# Patient Record
Sex: MALE | Race: WHITE | Employment: OTHER | URBAN - METROPOLITAN AREA
[De-identification: names, ages, dates, MRNs, and addresses within clinical notes are randomized per-mention and may not be internally consistent; named-entity substitution may affect disease eponyms.]

---

## 2021-01-06 ENCOUNTER — OFFICE VISIT (OUTPATIENT)
Dept: FAMILY MEDICINE CLINIC | Facility: CLINIC | Age: 46
End: 2021-01-06
Payer: COMMERCIAL

## 2021-01-06 VITALS
HEART RATE: 83 BPM | SYSTOLIC BLOOD PRESSURE: 110 MMHG | BODY MASS INDEX: 26.53 KG/M2 | WEIGHT: 169 LBS | OXYGEN SATURATION: 99 % | HEIGHT: 67 IN | DIASTOLIC BLOOD PRESSURE: 80 MMHG | TEMPERATURE: 98 F

## 2021-01-06 DIAGNOSIS — M25.561 CHRONIC PAIN OF RIGHT KNEE: Primary | ICD-10-CM

## 2021-01-06 DIAGNOSIS — G89.29 CHRONIC PAIN OF RIGHT KNEE: Primary | ICD-10-CM

## 2021-01-06 DIAGNOSIS — Z98.890 HISTORY OF REPAIR OF ACL: ICD-10-CM

## 2021-01-06 DIAGNOSIS — Z76.89 ENCOUNTER TO ESTABLISH CARE WITH NEW DOCTOR: ICD-10-CM

## 2021-01-06 PROBLEM — M25.562 LEFT KNEE PAIN: Status: ACTIVE | Noted: 2021-01-06

## 2021-01-06 PROCEDURE — 3079F DIAST BP 80-89 MM HG: CPT | Performed by: FAMILY MEDICINE

## 2021-01-06 PROCEDURE — 99214 OFFICE O/P EST MOD 30 MIN: CPT | Performed by: FAMILY MEDICINE

## 2021-01-06 PROCEDURE — 3008F BODY MASS INDEX DOCD: CPT | Performed by: FAMILY MEDICINE

## 2021-01-06 PROCEDURE — 3074F SYST BP LT 130 MM HG: CPT | Performed by: FAMILY MEDICINE

## 2021-01-06 RX ORDER — DICLOFENAC SODIUM 75 MG/1
75 TABLET, DELAYED RELEASE ORAL 2 TIMES DAILY
Qty: 14 TABLET | Refills: 0 | Status: SHIPPED | OUTPATIENT
Start: 2021-01-06 | End: 2021-01-13

## 2021-01-06 NOTE — PATIENT INSTRUCTIONS
Chronic knee pain  Rx Diclofenac 75 mg twice daily X 7 days - take with food   Icing the knee at least 5 minutes each time and perform this at least 3 times per day  Referral given to see Dr Yanick Zimmerman  Will defer PT for now     RHM  Due for annual physica

## 2021-01-06 NOTE — PROGRESS NOTES
[facility] Family Medicine Office Note  Chief Complaint:   Patient presents with:  Establish Care: L knee pain      HPI:   This is a 41year old male coming in to establish care with a new physician and also wants to address his L knee issues.  Hx 7\" (1.702 m)   Wt 169 lb (76.7 kg)   SpO2 99%   BMI 26.47 kg/m²  Estimated body mass index is 26.47 kg/m² as calculated from the following:    Height as of this encounter: 5' 7\" (1.702 m). Weight as of this encounter: 169 lb (76.7 kg).    Vital signs r

## 2021-01-13 ENCOUNTER — OFFICE VISIT (OUTPATIENT)
Dept: FAMILY MEDICINE CLINIC | Facility: CLINIC | Age: 46
End: 2021-01-13
Payer: COMMERCIAL

## 2021-01-13 VITALS
HEIGHT: 67 IN | BODY MASS INDEX: 27.15 KG/M2 | HEART RATE: 76 BPM | TEMPERATURE: 98 F | WEIGHT: 173 LBS | SYSTOLIC BLOOD PRESSURE: 110 MMHG | DIASTOLIC BLOOD PRESSURE: 80 MMHG | OXYGEN SATURATION: 99 %

## 2021-01-13 DIAGNOSIS — Z00.00 ANNUAL PHYSICAL EXAM: Primary | ICD-10-CM

## 2021-01-13 DIAGNOSIS — M25.562 LEFT KNEE PAIN, UNSPECIFIED CHRONICITY: ICD-10-CM

## 2021-01-13 PROCEDURE — 3074F SYST BP LT 130 MM HG: CPT | Performed by: FAMILY MEDICINE

## 2021-01-13 PROCEDURE — 3079F DIAST BP 80-89 MM HG: CPT | Performed by: FAMILY MEDICINE

## 2021-01-13 PROCEDURE — 99213 OFFICE O/P EST LOW 20 MIN: CPT | Performed by: FAMILY MEDICINE

## 2021-01-13 PROCEDURE — 3008F BODY MASS INDEX DOCD: CPT | Performed by: FAMILY MEDICINE

## 2021-01-13 PROCEDURE — 99396 PREV VISIT EST AGE 40-64: CPT | Performed by: FAMILY MEDICINE

## 2021-01-13 RX ORDER — PREDNISONE 10 MG/1
TABLET ORAL
Qty: 19 TABLET | Refills: 0 | Status: SHIPPED | OUTPATIENT
Start: 2021-01-13 | End: 2021-01-19

## 2021-01-14 ENCOUNTER — TELEPHONE (OUTPATIENT)
Dept: ORTHOPEDICS CLINIC | Facility: CLINIC | Age: 46
End: 2021-01-14

## 2021-01-14 NOTE — TELEPHONE ENCOUNTER
Patient has an appointment scheduled with Dr. Leighann Monge on 1/19 for left knee-acl injury. Will patient need imaging prior to appointment?

## 2021-01-14 NOTE — PROGRESS NOTES
HPI:   Timmy Bailey is a 41year old male who presents for an Annual Health Visit. I met him at the last visit when he was here to establish care and to discuss his knee issues.  Today here for his annual physical.       Allergies:   No Known Allergi General: alert, appears stated age and cooperative  Head: Normocephalic, without obvious abnormality, atraumatic  Eyes: normal conjunctivae noted bilaterally  Ears: normal TM's and external ear canals both ears  Nose: Nares normal. Septum midline.  Mucosa n Screening tests and vaccines are an important part of managing your health. A screening test is done to find possible disorders or diseases in people who don't have any symptoms.  The goal is to find a disease early so lifestyle changes can be made and you Syphilis Men at increased risk for infection – talk with your healthcare provider At routine exams   Tuberculosis Men at increased risk for infection – talk with your healthcare provider Check with your healthcare provider   Vision All men in this age [de-identified] Diet and exercise Men who are overweight or obese When diagnosed, and then at routine exams   Sexually transmitted infection prevention Men at increased risk for infection – talk with your healthcare provider At routine exams   Use of daily aspirin Men age

## 2021-01-19 ENCOUNTER — OFFICE VISIT (OUTPATIENT)
Dept: ORTHOPEDICS CLINIC | Facility: CLINIC | Age: 46
End: 2021-01-19
Payer: COMMERCIAL

## 2021-01-19 VITALS — OXYGEN SATURATION: 99 % | HEART RATE: 99 BPM

## 2021-01-19 DIAGNOSIS — Z98.890 HISTORY OF REPAIR OF ACL: ICD-10-CM

## 2021-01-19 DIAGNOSIS — M25.562 CHRONIC PAIN OF LEFT KNEE: Primary | ICD-10-CM

## 2021-01-19 DIAGNOSIS — G89.29 CHRONIC PAIN OF LEFT KNEE: Primary | ICD-10-CM

## 2021-01-19 PROCEDURE — 99204 OFFICE O/P NEW MOD 45 MIN: CPT | Performed by: ORTHOPAEDIC SURGERY

## 2021-01-19 RX ORDER — MELOXICAM 15 MG/1
15 TABLET ORAL DAILY
Qty: 14 TABLET | Refills: 1 | Status: SHIPPED | OUTPATIENT
Start: 2021-01-19

## 2021-01-19 NOTE — H&P
[facility] - ORTHOPEDICS  [facility] [address] [address]  [phone]     NEW PATIENT VISIT - HISTORY AND PHYSICAL EXAMINATION     Name: Kimmy Mattson   MRN: [id number]  Date: 1/19/2021     CC: Left Knee Pa Cardiovascular: Negative for chest pain and leg swelling. Gastrointestinal: Negative for abdominal pain, constipation, diarrhea, nausea and vomiting. Endocrine: Negative for cold intolerance and heat intolerance.    Genitourinary: Negative for difficult J-sign: none    ROM: Extension full  Flexion 140 degrees  ACL:  Negative Lachman, Negative Pivot Shift   PCL:  Negative Posterior Drawer  Collateral Ligaments: Stable to Varus and Valgus stress at 0 and 30 degrees  Strength: normal   Hip joint: normal pain PROCEDURE:  XR KNEE, COMPLETE (4 OR MORE VIEWS), LEFT ([id number])  TECHNIQUE:  AP, lateral, sunrise, and tunnel views were obtained  COMPARISON:  None.   INDICATIONS:  G89.29 Chronic pain of left knee M25.562 Chronic pain of left knee  PATIENT STATED HISTOR I discussed that his stable examination was reassuring although the pain may be attributed to a cartilage injury or tendinopathy. I will contact him and ask him to return for follow-up evaluation after completion of the MRI study.   In the meantime I provi

## 2021-01-20 RX ORDER — MELOXICAM 7.5 MG/1
7.5 TABLET ORAL NIGHTLY
Qty: 14 TABLET | Refills: 0 | Status: SHIPPED | OUTPATIENT
Start: 2021-01-20 | End: 2021-02-02

## 2021-01-26 ENCOUNTER — HOSPITAL ENCOUNTER (OUTPATIENT)
Dept: MRI IMAGING | Facility: HOSPITAL | Age: 46
Discharge: HOME | End: 2021-01-26
Attending: ORTHOPAEDIC SURGERY
Payer: COMMERCIAL

## 2021-01-26 DIAGNOSIS — M25.562 CHRONIC PAIN OF LEFT KNEE: ICD-10-CM

## 2021-01-26 DIAGNOSIS — G89.29 CHRONIC PAIN OF LEFT KNEE: ICD-10-CM

## 2021-01-26 PROCEDURE — 73721 MRI JNT OF LWR EXTRE W/O DYE: CPT | Performed by: ORTHOPAEDIC SURGERY

## 2021-02-02 ENCOUNTER — OFFICE VISIT (OUTPATIENT)
Dept: ORTHOPEDICS CLINIC | Facility: CLINIC | Age: 46
End: 2021-02-02
Payer: COMMERCIAL

## 2021-02-02 VITALS — HEART RATE: 95 BPM | OXYGEN SATURATION: 99 %

## 2021-02-02 DIAGNOSIS — M25.562 CHRONIC PAIN OF LEFT KNEE: Primary | ICD-10-CM

## 2021-02-02 DIAGNOSIS — G89.29 CHRONIC PAIN OF LEFT KNEE: Primary | ICD-10-CM

## 2021-02-02 PROCEDURE — 99213 OFFICE O/P EST LOW 20 MIN: CPT | Performed by: ORTHOPAEDIC SURGERY

## 2021-02-02 RX ORDER — MELOXICAM 15 MG/1
15 TABLET ORAL DAILY
Qty: 30 TABLET | Refills: 2 | Status: SHIPPED | OUTPATIENT
Start: 2021-02-02

## 2021-02-02 NOTE — PROGRESS NOTES
[facility]  [address] [place] [phone]     Name: Zachery Maldonado   MRN: [id number]  Date: 2/2/2021     REASON FOR VISIT: MRI review of left knee for evaluation of intermittent s No significant atrophy, swelling or effusion. Full range of motion. Neurovascularly intact distally. Radiographic Examination/Diagnostics: MRI study of the left knee personally viewed, independently interpreted and radiology report was reviewed.   Cons PROCEDURE:  MRI KNEE, LEFT (ZAJ=[id number])  COMPARISON:  CRISTOBAL Krishnamurthy, XR KNEE, COMPLETE (4 OR MORE VIEWS), LEFT (GST=[id number]), 1/19/2021, 12:26 PM.  INDICATIONS:  32-year-old male presents with chronic left knee pain.   History of ACL injury and ACL graft repa CONCLUSION:  1. Patient is status post ACL graft placement, performed in 1998. No postoperative complications or graft failure suspected. 2. There is mild chondromalacia within the patellofemoral and lateral femoral tibial compartments.   No king osteoarth

## 2021-02-12 ENCOUNTER — TELEPHONE (OUTPATIENT)
Dept: FAMILY MEDICINE CLINIC | Facility: CLINIC | Age: 46
End: 2021-02-12

## 2022-02-12 LAB
AMB EXT CHOLESTEROL, TOTAL: 261 MG/DL
AMB EXT HDL CHOLESTEROL: 39 MG/DL
AMB EXT LDL CHOLESTEROL, DIRECT: 193 MG/DL
AMB EXT TRIGLYCERIDES: 155 MG/DL

## 2022-02-14 ENCOUNTER — TELEPHONE (OUTPATIENT)
Dept: FAMILY MEDICINE CLINIC | Facility: CLINIC | Age: 47
End: 2022-02-14

## 2022-02-14 NOTE — TELEPHONE ENCOUNTER
Received fax from [facility] regarding pt's lab results    Collection date: 02/12/2022    Dr. Anamaria Mahoney to review.  Thank you

## 2022-02-14 NOTE — TELEPHONE ENCOUNTER
Patient advised of Doctor's note below. Patient verbalized understanding. No further questions at this time. Pt request to cancel appt next week 02/24/22 - appt made to discuss labs below    No future appointments.

## 2022-02-14 NOTE — TELEPHONE ENCOUNTER
Labs reviewed. Elevated cholesterol noted - TC : 261 and Trigs: 155, HDL: 39. He should really start to clean up his diet, if not already doing so. Cholesterol / Triglycerides are elevated. Avoid saturated fats (processed foods, animal products - meat and eggs). Consume good fats - nuts, avocados, olive oil, fatty fish 6 ounces / week. Recommend Omega 3 fish oil / Judah red krill oil supplements that may help with your cholesterol. Most importantly, consistent daily exercise for 30 minutes/day on 5 out of 7 days will help make significant changes to your cholesterol levels. We will monitor this annually.

## 2022-02-16 ENCOUNTER — MED REC SCAN ONLY (OUTPATIENT)
Dept: FAMILY MEDICINE CLINIC | Facility: CLINIC | Age: 47
End: 2022-02-16

## 2022-12-23 ENCOUNTER — TELEPHONE (OUTPATIENT)
Dept: FAMILY MEDICINE CLINIC | Facility: CLINIC | Age: 47
End: 2022-12-23

## 2022-12-23 NOTE — TELEPHONE ENCOUNTER
Advised patient's spouse of Doctor's note below. She verbalized understanding. No further questions at this time.

## 2022-12-23 NOTE — TELEPHONE ENCOUNTER
Evangelina Campbell from [facility] called    Per pharmacy policy, they need to verify patient's renal function for Paxlovid rx    To determine if lower dose is needed    Please adv  Thank you

## 2022-12-23 NOTE — TELEPHONE ENCOUNTER
Labs completed 02/12/2022 - (see lab results scan)  CMP - WNL results    Called pharmacy back - advised of note above - v/u, no further questions at this time

## 2022-12-23 NOTE — TELEPHONE ENCOUNTER
Pt's spouse Wilda Arauz called as pt now has covid. His symptoms started on Monday. He has body aches, fever,chills,and cough. Spouse wants to know if pt could get paxlovid ? Ifrah Marquez has covid and was sent Paxlovid on Tuesday by Dr. Jyoti Malagon.

## 2022-12-23 NOTE — TELEPHONE ENCOUNTER
Left message to voicemail (per verbal release form consent, no identifying message to confirm.)  Advised patient/spouse to call office back [phone] - need to discuss doctor's note below.

## 2022-12-23 NOTE — TELEPHONE ENCOUNTER
LOV 01/13/2021 with Dr. Danette Cherry    Pt's spouse tested positive COVID  Dr. Rudy Tsang sent Rx Paxlovid on Tuesday for spouse    Pt positive covid now - symptoms started Monday  Ok to send Rx Paxlovid?     Please advise, thank you

## 2023-02-11 LAB
AMB EXT CHOL/HDL RATIO: 7.4
AMB EXT CHOLESTEROL, TOTAL: 294 MG/DL
AMB EXT CMP ALT: 15 U/L
AMB EXT CMP AST: 20 U/L
AMB EXT HDL CHOLESTEROL: 40 MG/DL
AMB EXT LDL CHOLESTEROL, DIRECT: 219 MG/DL
AMB EXT TRIGLYCERIDES: 181 MG/DL

## 2023-02-27 ENCOUNTER — TELEPHONE (OUTPATIENT)
Dept: FAMILY MEDICINE CLINIC | Facility: CLINIC | Age: 48
End: 2023-02-27

## 2023-02-27 NOTE — TELEPHONE ENCOUNTER
Outside test results received by fax and reviewed by [facility]. Per [facility], cholesterol elevated.  Schedule appt to f/u    Sent to scan

## 2023-02-27 NOTE — TELEPHONE ENCOUNTER
Advised patient of Doctor's note below. Patient verbalized understanding. No further questions at this time.     Future Appointments   Date Time Provider Poornima Soler   3/1/2023  9:20 AM KYLAH Castro [facility]

## 2023-03-01 ENCOUNTER — PATIENT MESSAGE (OUTPATIENT)
Dept: FAMILY MEDICINE CLINIC | Facility: CLINIC | Age: 48
End: 2023-03-01

## 2023-03-01 ENCOUNTER — OFFICE VISIT (OUTPATIENT)
Dept: FAMILY MEDICINE CLINIC | Facility: CLINIC | Age: 48
End: 2023-03-01
Payer: COMMERCIAL

## 2023-03-01 VITALS
HEART RATE: 84 BPM | SYSTOLIC BLOOD PRESSURE: 120 MMHG | WEIGHT: 161 LBS | OXYGEN SATURATION: 98 % | DIASTOLIC BLOOD PRESSURE: 82 MMHG | HEIGHT: 66.5 IN | BODY MASS INDEX: 25.57 KG/M2 | TEMPERATURE: 98 F

## 2023-03-01 DIAGNOSIS — Z00.00 GENERAL MEDICAL EXAM: Primary | ICD-10-CM

## 2023-03-01 DIAGNOSIS — E78.2 MIXED HYPERLIPIDEMIA: ICD-10-CM

## 2023-03-01 DIAGNOSIS — Z12.5 SCREENING PSA (PROSTATE SPECIFIC ANTIGEN): ICD-10-CM

## 2023-03-01 LAB
COMPLEXED PSA SERPL-MCNC: 0.55 NG/ML (ref ?–4)
T4 FREE SERPL-MCNC: 1 NG/DL (ref 0.8–1.7)
TSI SER-ACNC: 1.57 MIU/ML (ref 0.36–3.74)

## 2023-03-01 PROCEDURE — 99396 PREV VISIT EST AGE 40-64: CPT | Performed by: NURSE PRACTITIONER

## 2023-03-01 PROCEDURE — 3008F BODY MASS INDEX DOCD: CPT | Performed by: NURSE PRACTITIONER

## 2023-03-01 PROCEDURE — 3079F DIAST BP 80-89 MM HG: CPT | Performed by: NURSE PRACTITIONER

## 2023-03-01 PROCEDURE — 84439 ASSAY OF FREE THYROXINE: CPT | Performed by: NURSE PRACTITIONER

## 2023-03-01 PROCEDURE — 84443 ASSAY THYROID STIM HORMONE: CPT | Performed by: NURSE PRACTITIONER

## 2023-03-01 PROCEDURE — 3074F SYST BP LT 130 MM HG: CPT | Performed by: NURSE PRACTITIONER

## 2023-03-01 NOTE — TELEPHONE ENCOUNTER
From: Williams Swann  To: KYLAH Iverson  Sent: 3/1/2023 11:09 AM CST  Subject: Mychart blood work    I was going to send you a picture of my liver function results from the mychart blood work. But it is no longer on it. Not sure what steps need to be taken next.  Thanks Nasima Garvin [phone]

## 2023-03-01 NOTE — TELEPHONE ENCOUNTER
Patient had labs done through work. We only external resulted the lipids. I need to see the liver enzymes prior to starting statin. The form was sent to scanning. Will have to wait until it is uploaded.

## 2023-03-01 NOTE — PATIENT INSTRUCTIONS
Check with insurance to see if they will cover a screening colonoscopy  Check date for last Tetanus shot  Send in [employer] with outside lab work. Will check liver enzymes. If they are normal, will send in cholesterol medication.

## 2023-03-02 RX ORDER — ATORVASTATIN CALCIUM 10 MG/1
10 TABLET, FILM COATED ORAL NIGHTLY
Qty: 90 TABLET | Refills: 0 | Status: SHIPPED | OUTPATIENT
Start: 2023-03-02

## 2023-03-02 NOTE — TELEPHONE ENCOUNTER
Liver enzymes look normal  Start Statin, sending to pharmacy  Recheck fasting lipid and LFTs in 8 weeks

## 2023-03-18 ENCOUNTER — HOSPITAL ENCOUNTER (OUTPATIENT)
Dept: CT IMAGING | Age: 48
Discharge: HOME | End: 2023-03-18
Attending: NURSE PRACTITIONER

## 2023-03-18 DIAGNOSIS — Z13.6 ENCOUNTER FOR SCREENING FOR CARDIOVASCULAR DISORDERS: ICD-10-CM

## 2023-03-20 ENCOUNTER — TELEPHONE (OUTPATIENT)
Dept: FAMILY MEDICINE CLINIC | Facility: CLINIC | Age: 48
End: 2023-03-20

## 2023-03-20 NOTE — TELEPHONE ENCOUNTER
----- Message from KYLAH Donaldson sent at 3/20/2023  8:30 AM CDT -----  Results reviewed.  CT overread normal

## 2023-06-20 ENCOUNTER — TELEPHONE (OUTPATIENT)
Dept: FAMILY MEDICINE CLINIC | Facility: CLINIC | Age: 48
End: 2023-06-20

## 2023-06-20 DIAGNOSIS — Z51.81 ENCOUNTER FOR MEDICATION MONITORING: ICD-10-CM

## 2023-06-20 DIAGNOSIS — E78.2 MIXED HYPERLIPIDEMIA: Primary | ICD-10-CM

## 2023-06-20 NOTE — TELEPHONE ENCOUNTER
Lab result scan 03/27/23 - with note \"schedule appt for cholesterol follow-up\" per Dr. Kalpana Sorensen    Please see note below    LOV 03/01/23 w/ APRN     Order(s) pending, please review. Thank you.     Future Appointments   Date Time Provider Poornima Soler   6/27/2023  9:00 AM  [facility]

## 2023-06-20 NOTE — TELEPHONE ENCOUNTER
Patient made nurse visit appt for 6/27 to recheck liver and lipids    See last labs    Need orders placed    Please adv  Thank you

## 2023-06-27 ENCOUNTER — NURSE ONLY (OUTPATIENT)
Dept: FAMILY MEDICINE CLINIC | Facility: CLINIC | Age: 48
End: 2023-06-27
Payer: COMMERCIAL

## 2023-06-27 DIAGNOSIS — Z51.81 ENCOUNTER FOR MEDICATION MONITORING: ICD-10-CM

## 2023-06-27 DIAGNOSIS — E78.2 MIXED HYPERLIPIDEMIA: ICD-10-CM

## 2023-06-27 LAB
ALBUMIN SERPL-MCNC: 4.3 G/DL (ref 3.4–5)
ALBUMIN/GLOB SERPL: 1.5 {RATIO} (ref 1–2)
ALP LIVER SERPL-CCNC: 95 U/L
ALT SERPL-CCNC: 33 U/L
ANION GAP SERPL CALC-SCNC: 5 MMOL/L (ref 0–18)
AST SERPL-CCNC: 20 U/L (ref 15–37)
BILIRUB SERPL-MCNC: 0.9 MG/DL (ref 0.1–2)
BUN BLD-MCNC: 17 MG/DL (ref 7–18)
CALCIUM BLD-MCNC: 9.4 MG/DL (ref 8.5–10.1)
CHLORIDE SERPL-SCNC: 107 MMOL/L (ref 98–112)
CHOLEST SERPL-MCNC: 231 MG/DL (ref ?–200)
CO2 SERPL-SCNC: 26 MMOL/L (ref 21–32)
CREAT BLD-MCNC: 1.03 MG/DL
FASTING PATIENT LIPID ANSWER: YES
FASTING STATUS PATIENT QL REPORTED: YES
GFR SERPLBLD BASED ON 1.73 SQ M-ARVRAT: 91 ML/MIN/1.73M2 (ref 60–?)
GLOBULIN PLAS-MCNC: 2.8 G/DL (ref 2.8–4.4)
GLUCOSE BLD-MCNC: 96 MG/DL (ref 70–99)
HDLC SERPL-MCNC: 50 MG/DL (ref 40–59)
LDLC SERPL CALC-MCNC: 164 MG/DL (ref ?–100)
NONHDLC SERPL-MCNC: 181 MG/DL (ref ?–130)
OSMOLALITY SERPL CALC.SUM OF ELEC: 287 MOSM/KG (ref 275–295)
POTASSIUM SERPL-SCNC: 3.9 MMOL/L (ref 3.5–5.1)
PROT SERPL-MCNC: 7.1 G/DL (ref 6.4–8.2)
SODIUM SERPL-SCNC: 138 MMOL/L (ref 136–145)
TRIGL SERPL-MCNC: 97 MG/DL (ref 30–149)
VLDLC SERPL CALC-MCNC: 19 MG/DL (ref 0–30)

## 2023-06-27 PROCEDURE — 80061 LIPID PANEL: CPT | Performed by: NURSE PRACTITIONER

## 2023-06-27 PROCEDURE — 80053 COMPREHEN METABOLIC PANEL: CPT | Performed by: NURSE PRACTITIONER

## 2023-07-03 RX ORDER — ATORVASTATIN CALCIUM 20 MG/1
20 TABLET, FILM COATED ORAL NIGHTLY
Qty: 90 TABLET | Refills: 0 | Status: SHIPPED | OUTPATIENT
Start: 2023-07-03 | End: 2023-10-01

## 2023-09-12 ENCOUNTER — HOSPITAL ENCOUNTER (OUTPATIENT)
Dept: GENERAL RADIOLOGY | Age: 48
Discharge: HOME | End: 2023-09-12
Attending: NURSE PRACTITIONER
Payer: COMMERCIAL

## 2023-09-12 ENCOUNTER — OFFICE VISIT (OUTPATIENT)
Dept: FAMILY MEDICINE CLINIC | Facility: CLINIC | Age: 48
End: 2023-09-12
Payer: COMMERCIAL

## 2023-09-12 VITALS
HEIGHT: 67 IN | HEART RATE: 85 BPM | DIASTOLIC BLOOD PRESSURE: 60 MMHG | WEIGHT: 159.19 LBS | OXYGEN SATURATION: 97 % | SYSTOLIC BLOOD PRESSURE: 102 MMHG | TEMPERATURE: 98 F | BODY MASS INDEX: 24.99 KG/M2

## 2023-09-12 DIAGNOSIS — E78.2 MIXED HYPERLIPIDEMIA: ICD-10-CM

## 2023-09-12 DIAGNOSIS — M54.2 CHRONIC NECK PAIN: Primary | ICD-10-CM

## 2023-09-12 DIAGNOSIS — M54.2 CHRONIC NECK PAIN: ICD-10-CM

## 2023-09-12 DIAGNOSIS — G89.29 CHRONIC NECK PAIN: ICD-10-CM

## 2023-09-12 DIAGNOSIS — G89.29 CHRONIC NECK PAIN: Primary | ICD-10-CM

## 2023-09-12 LAB
ALBUMIN SERPL-MCNC: 4.1 G/DL (ref 3.4–5)
ALP LIVER SERPL-CCNC: 113 U/L
ALT SERPL-CCNC: 47 U/L
AST SERPL-CCNC: 29 U/L (ref 15–37)
BASOPHILS # BLD AUTO: 0.06 X10(3) UL (ref 0–0.2)
BASOPHILS NFR BLD AUTO: 0.9 %
BILIRUB DIRECT SERPL-MCNC: 0.1 MG/DL (ref 0–0.2)
BILIRUB SERPL-MCNC: 0.4 MG/DL (ref 0.1–2)
CHOLEST SERPL-MCNC: 161 MG/DL (ref ?–200)
CK SERPL-CCNC: 113 U/L
EOSINOPHIL # BLD AUTO: 0.09 X10(3) UL (ref 0–0.7)
EOSINOPHIL NFR BLD AUTO: 1.4 %
ERYTHROCYTE [DISTWIDTH] IN BLOOD BY AUTOMATED COUNT: 12 %
FASTING PATIENT LIPID ANSWER: YES
HCT VFR BLD AUTO: 44.3 %
HDLC SERPL-MCNC: 45 MG/DL (ref 40–59)
HGB BLD-MCNC: 14.9 G/DL
IMM GRANULOCYTES # BLD AUTO: 0.03 X10(3) UL (ref 0–1)
IMM GRANULOCYTES NFR BLD: 0.5 %
LDLC SERPL CALC-MCNC: 79 MG/DL (ref ?–100)
LYMPHOCYTES # BLD AUTO: 1.51 X10(3) UL (ref 1–4)
LYMPHOCYTES NFR BLD AUTO: 22.8 %
MCH RBC QN AUTO: 29.6 PG (ref 26–34)
MCHC RBC AUTO-ENTMCNC: 33.6 G/DL (ref 31–37)
MCV RBC AUTO: 88.1 FL
MONOCYTES # BLD AUTO: 0.64 X10(3) UL (ref 0.1–1)
MONOCYTES NFR BLD AUTO: 9.7 %
NEUTROPHILS # BLD AUTO: 4.29 X10 (3) UL (ref 1.5–7.7)
NEUTROPHILS # BLD AUTO: 4.29 X10(3) UL (ref 1.5–7.7)
NEUTROPHILS NFR BLD AUTO: 64.7 %
NONHDLC SERPL-MCNC: 116 MG/DL (ref ?–130)
PLATELET # BLD AUTO: 233 10(3)UL (ref 150–450)
PROT SERPL-MCNC: 7.1 G/DL (ref 6.4–8.2)
RBC # BLD AUTO: 5.03 X10(6)UL
TRIGL SERPL-MCNC: 222 MG/DL (ref 30–149)
VLDLC SERPL CALC-MCNC: 35 MG/DL (ref 0–30)
WBC # BLD AUTO: 6.6 X10(3) UL (ref 4–11)

## 2023-09-12 PROCEDURE — 3074F SYST BP LT 130 MM HG: CPT | Performed by: NURSE PRACTITIONER

## 2023-09-12 PROCEDURE — 82550 ASSAY OF CK (CPK): CPT | Performed by: NURSE PRACTITIONER

## 2023-09-12 PROCEDURE — 72050 X-RAY EXAM NECK SPINE 4/5VWS: CPT | Performed by: NURSE PRACTITIONER

## 2023-09-12 PROCEDURE — 3008F BODY MASS INDEX DOCD: CPT | Performed by: NURSE PRACTITIONER

## 2023-09-12 PROCEDURE — 85025 COMPLETE CBC W/AUTO DIFF WBC: CPT | Performed by: NURSE PRACTITIONER

## 2023-09-12 PROCEDURE — 80076 HEPATIC FUNCTION PANEL: CPT | Performed by: NURSE PRACTITIONER

## 2023-09-12 PROCEDURE — 99213 OFFICE O/P EST LOW 20 MIN: CPT | Performed by: NURSE PRACTITIONER

## 2023-09-12 PROCEDURE — 3078F DIAST BP <80 MM HG: CPT | Performed by: NURSE PRACTITIONER

## 2023-09-12 PROCEDURE — 80061 LIPID PANEL: CPT | Performed by: NURSE PRACTITIONER

## 2023-09-28 RX ORDER — ATORVASTATIN CALCIUM 20 MG/1
20 TABLET, FILM COATED ORAL NIGHTLY
Qty: 90 TABLET | Refills: 1 | Status: SHIPPED | OUTPATIENT
Start: 2023-09-28

## 2023-09-28 NOTE — TELEPHONE ENCOUNTER
Cholesterol Medication Protocol [id number]/28/2023 09:33 AM   Protocol Details ALT < 80    ALT resulted within past year    Lipid panel within past 12 months    Appointment within past 12 or next 3 months     Last OV 9/12/23  Last lab 6/27/23 lipid, cmp/alt 33  Last refilled 7/3/23  #90  0 refill    Refilled per protocol x 6 months

## 2024-01-27 ENCOUNTER — TELEPHONE (OUTPATIENT)
Dept: FAMILY MEDICINE CLINIC | Facility: CLINIC | Age: 49
End: 2024-01-27

## 2024-01-27 DIAGNOSIS — R05.1 ACUTE COUGH: Primary | ICD-10-CM

## 2024-01-27 RX ORDER — BENZONATATE 100 MG/1
100 CAPSULE ORAL 2 TIMES DAILY PRN
Qty: 6 CAPSULE | Refills: 0 | Status: SHIPPED | OUTPATIENT
Start: 2024-01-27 | End: 2024-01-29 | Stop reason: ALTCHOICE

## 2024-01-27 NOTE — TELEPHONE ENCOUNTER
Take at home covid test  Benzonatate sent  Plan to follow-up Monday, consider walk-in care if worsening symptoms over weekend

## 2024-01-27 NOTE — TELEPHONE ENCOUNTER
Fever 100-102 last 6 days  Dry Cough, body aches, chills, hoarse voice, sinus pressure.    Only taking nighttime meds and tylenol (inconsistent) no improvement.    Pt wife asking nurse to call pt directly [phone] to discuss symptoms or advise OV (scheduled w/ [facility] for Monday) or advise UC/ER

## 2024-01-27 NOTE — TELEPHONE ENCOUNTER
Spoke with the pt he has been sick for about a week- he had a temp and body aches.  Temp running   Body aches resolved about a day or so ago  No sinus or facial pressure.     He has not taken a covid test - says that he will take one this afternoon    Coughing so much back and chest hurt- nothing coming up. No SOB  Is asking for something for his cough    He is scheduled for Monday  Future Appointments   Date Time Provider Department Center   1/29/2024  9:40 AM Emely Kuhn APRN [facility] [place]

## 2024-01-29 ENCOUNTER — TELEPHONE (OUTPATIENT)
Dept: FAMILY MEDICINE CLINIC | Facility: CLINIC | Age: 49
End: 2024-01-29

## 2024-01-29 ENCOUNTER — OFFICE VISIT (OUTPATIENT)
Dept: FAMILY MEDICINE CLINIC | Facility: CLINIC | Age: 49
End: 2024-01-29
Payer: COMMERCIAL

## 2024-01-29 ENCOUNTER — HOSPITAL ENCOUNTER (OUTPATIENT)
Dept: GENERAL RADIOLOGY | Age: 49
Discharge: HOME | End: 2024-01-29
Attending: NURSE PRACTITIONER
Payer: COMMERCIAL

## 2024-01-29 VITALS
HEART RATE: 86 BPM | OXYGEN SATURATION: 97 % | DIASTOLIC BLOOD PRESSURE: 80 MMHG | HEIGHT: 67 IN | WEIGHT: 161 LBS | SYSTOLIC BLOOD PRESSURE: 116 MMHG | RESPIRATION RATE: 16 BRPM | BODY MASS INDEX: 25.27 KG/M2 | TEMPERATURE: 99 F

## 2024-01-29 DIAGNOSIS — R05.1 ACUTE COUGH: Primary | ICD-10-CM

## 2024-01-29 DIAGNOSIS — R05.1 ACUTE COUGH: ICD-10-CM

## 2024-01-29 DIAGNOSIS — R68.83 CHILLS: ICD-10-CM

## 2024-01-29 DIAGNOSIS — R50.81 FEVER IN OTHER DISEASES: ICD-10-CM

## 2024-01-29 PROCEDURE — 71046 X-RAY EXAM CHEST 2 VIEWS: CPT | Performed by: NURSE PRACTITIONER

## 2024-01-29 PROCEDURE — 99213 OFFICE O/P EST LOW 20 MIN: CPT | Performed by: NURSE PRACTITIONER

## 2024-01-29 PROCEDURE — 3074F SYST BP LT 130 MM HG: CPT | Performed by: NURSE PRACTITIONER

## 2024-01-29 PROCEDURE — 3008F BODY MASS INDEX DOCD: CPT | Performed by: NURSE PRACTITIONER

## 2024-01-29 PROCEDURE — 3079F DIAST BP 80-89 MM HG: CPT | Performed by: NURSE PRACTITIONER

## 2024-01-29 RX ORDER — METHYLPREDNISOLONE 4 MG/1
TABLET ORAL
Qty: 21 EACH | Refills: 0 | Status: SHIPPED | OUTPATIENT
Start: 2024-01-29

## 2024-01-29 RX ORDER — AZITHROMYCIN 250 MG/1
TABLET, FILM COATED ORAL
Qty: 6 TABLET | Refills: 0 | Status: SHIPPED | OUTPATIENT
Start: 2024-01-29 | End: 2024-02-03

## 2024-01-29 RX ORDER — BENZONATATE 100 MG/1
100 CAPSULE ORAL 2 TIMES DAILY PRN
Qty: 6 CAPSULE | Refills: 0 | Status: SHIPPED | OUTPATIENT
Start: 2024-01-29

## 2024-01-29 NOTE — TELEPHONE ENCOUNTER
----- Message from KLYAH Comer sent at 1/29/2024  1:37 PM CST -----  CXR negative, continue rx as prescribed.  Follow-up in 1 week if no improvement, earlier if worsening symptoms

## 2024-01-29 NOTE — PROGRESS NOTES
CHIEF COMPLAINT:    Chief Complaint   Patient presents with    Cough     Fever, chills, body aches, hoarse voice       HISTORY OF PRESENT ILLNESS:    Kain presents today, 2024, for one week history of chills, body aches, and persistent dry cough.  Symptoms improved Thursday and returned Friday.  Of all symptoms, cough is the most concernening to Kain as it has interfered with sleep quality, sleeping about 2 hours a night.  Cough is dry and persistent.  Worse at night.  Negative at home covid test.  Positive for fatigue and intermittent sore throat.  Denies nausea, vomiting, or diarrhea.    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 90 tablet 1       MEDICAL HISTORY:  No past medical history on file.  Past Surgical History:   Procedure Laterality Date    INGUINAL HERNIA REPAIR      Late teens when repaired     KNEE SURGERY Left     SHOULDER ARTHROSCOPY Right      Family History   Problem Relation Age of Onset    Other (BRAIN CANCER) Maternal Grandmother 70    Other (Brain cancer) Maternal Grandfather 70     Family Status   Relation Status    Mo Alive    Fa Alive    Sis Alive    MGMA     MGFA     PGMA     PGFA      Social History     Socioeconomic History    Marital status:    Occupational History    Occupation: Maintainence      Employer: [employer] [address] [place] [address]   Tobacco Use    Smoking status: Never    Smokeless tobacco: Never   Vaping Use    Vaping Use: Never used   Substance and Sexual Activity    Alcohol use: Never    Drug use: Never    Sexual activity: Yes     Partners: Female     Comment: No kids        ROS:  As stated in HPI    VITALS:   /80   Pulse 86   Temp 98.8 °F (37.1 °C) (Temporal)   Resp 16   Ht 5' 7\" (1.702 m)   Wt 161 lb  (73 kg)   SpO2 97%   BMI 25.22 kg/m²     Reviewed by Emely Kuhn MS, APRN, FNP-BC    PHYSICAL EXAM:    Constitutional:       Appears well.  Sitting upright on exam table.  Well developed, well nourished, and in no acute distress  HEENT:      Facial features symmetric. Normocephalic and atraumatic     Sclera anicteric.  EOMs intact without nystagmus.  Pupils round and equal.  Cardiovascular:      Heart sounds: Regular rate and rhythm without murmur  Pulmonary:      Chest expansion symmetric.  Breathing nonlabored. Lungs clear throughout  Musculoskeletal:         Movements smooth and controlled with appropriate coordination.       Gait intact, steady, nonantalgic.  Skin:     Warm and dry without discoloration.  Psychiatric:         Alert and oriented.  Calm and cooperative.  Speech is clear.     ASSESSMENT & PLAN:    1. Fever in other diseases  - XR CHEST PA + LAT CHEST (CPT=71046); Future  - azithromycin 250 MG Oral Tab; Take 2 tablets (500 mg total) by mouth daily for 1 day, THEN 1 tablet (250 mg total) daily for 4 days.  Dispense: 6 tablet; Refill: 0    2. Chills  - XR CHEST PA + LAT CHEST (CPT=71046); Future  - azithromycin 250 MG Oral Tab; Take 2 tablets (500 mg total) by mouth daily for 1 day, THEN 1 tablet (250 mg total) daily for 4 days.  Dispense: 6 tablet; Refill: 0    3. Acute cough  - XR CHEST PA + LAT CHEST (CPT=71046); Future  - methylPREDNISolone (MEDROL) 4 MG Oral Tablet Therapy Pack; As directed.  Dispense: 21 each; Refill: 0  - azithromycin 250 MG Oral Tab; Take 2 tablets (500 mg total) by mouth daily for 1 day, THEN 1 tablet (250 mg total) daily for 4 days.  Dispense: 6 tablet; Refill: 0  - benzonatate 100 MG Oral Cap; Take 1 capsule (100 mg total) by mouth 2 (two) times daily as needed for cough.  Dispense: 6 capsule; Refill: 0    Follow-up 3-5 days PRN

## 2024-02-07 ENCOUNTER — TELEPHONE (OUTPATIENT)
Dept: FAMILY MEDICINE CLINIC | Facility: CLINIC | Age: 49
End: 2024-02-07

## 2024-02-07 NOTE — TELEPHONE ENCOUNTER
Pt wife dropped off ins wellness form for Emely to complete and fax to ins.     Form placed in pcp inbox.

## 2024-02-08 NOTE — TELEPHONE ENCOUNTER
Form faxed to number on form per patient request  Original mailed to his house  Copy sent to scan

## 2024-02-08 NOTE — TELEPHONE ENCOUNTER
I can review and see if appropriate. Last physical was 3/2023  Please fax to [facility], thanks!    Also please update PCP so there won't be confusion going forward. Thanks!

## 2024-02-08 NOTE — TELEPHONE ENCOUNTER
Forward to Taina Klein, please see below. Please advise if we can fax form and will you fill out?     Thank you.

## 2024-03-21 RX ORDER — ATORVASTATIN CALCIUM 20 MG/1
20 TABLET, FILM COATED ORAL NIGHTLY
Qty: 90 TABLET | Refills: 0 | Status: SHIPPED | OUTPATIENT
Start: 2024-03-21

## 2024-03-21 NOTE — TELEPHONE ENCOUNTER
Cholesterol Medication Protocol [id number]/21/2024 01:30 AM   Protocol Details ALT < 80    ALT resulted within past year    Lipid panel within past 12 months    In person appointment or virtual visit in the past 12 mos or appointment in next 3 mos   Refilled per protocol  atorvastatin 20 MG Oral Tab   Last refilled on 9/28/23 #90 with 1 rf.  LOV- 1/29/24  Last labs- 9/12/23    Sent to pharmacy

## 2024-06-21 RX ORDER — ATORVASTATIN CALCIUM 20 MG/1
20 TABLET, FILM COATED ORAL NIGHTLY
Qty: 90 TABLET | Refills: 0 | Status: SHIPPED | OUTPATIENT
Start: 2024-06-21

## 2024-06-21 NOTE — TELEPHONE ENCOUNTER
Cholesterol Medication Protocol [id number]/21/2024 10:11 AM   Protocol Details ALT < 80    ALT resulted within past year    Lipid panel within past 12 months    In person appointment or virtual visit in the past 12 mos or appointment in next 3 mos      Refilled per protocol  ATORVASTATIN 20 MG Oral Tab   Last refilled on 3/21/24 #90 with 0 rf.  LOV- 1/29/24  Last labs- 9/12/23    Sent to pharmacy

## 2024-10-21 ENCOUNTER — OFFICE VISIT (OUTPATIENT)
Dept: FAMILY MEDICINE CLINIC | Facility: CLINIC | Age: 49
End: 2024-10-21
Payer: COMMERCIAL

## 2024-10-21 VITALS
DIASTOLIC BLOOD PRESSURE: 60 MMHG | SYSTOLIC BLOOD PRESSURE: 106 MMHG | RESPIRATION RATE: 16 BRPM | HEIGHT: 67 IN | BODY MASS INDEX: 25.11 KG/M2 | OXYGEN SATURATION: 99 % | WEIGHT: 160 LBS | HEART RATE: 98 BPM | TEMPERATURE: 98 F

## 2024-10-21 DIAGNOSIS — R10.33 PERIUMBILICAL PAIN: ICD-10-CM

## 2024-10-21 DIAGNOSIS — R14.0 ABDOMINAL BLOATING: Primary | ICD-10-CM

## 2024-10-21 PROCEDURE — 3008F BODY MASS INDEX DOCD: CPT | Performed by: NURSE PRACTITIONER

## 2024-10-21 PROCEDURE — 99213 OFFICE O/P EST LOW 20 MIN: CPT | Performed by: NURSE PRACTITIONER

## 2024-10-21 PROCEDURE — 3074F SYST BP LT 130 MM HG: CPT | Performed by: NURSE PRACTITIONER

## 2024-10-21 PROCEDURE — 3078F DIAST BP <80 MM HG: CPT | Performed by: NURSE PRACTITIONER

## 2024-10-21 NOTE — PROGRESS NOTES
CHIEF COMPLAINT:    Chief Complaint   Patient presents with    Stomach Pain     Left side stomach pain, happens random times, bloating, increased burping       HISTORY OF PRESENT ILLNESS:    Kain who has a history of high cholesterol presents today, 2024, for abdominal pain.    Abdominal pain began 5 months ago, has occurred 4 times.  Pain is to left periumbilical region and moves to right periumbilical region.  Described as \"the worst gas pain ever\" and the following days feels like a pulled muscle.  Increased burping and abdominal bloating.  Defecating 3 times a day, soft and brown.     Kain believes father has history of diverticulitis/diverticulosis and/or mother with irritable bowel syndrome.    Denies fevers, chills, night sweats, nausea, vomiting, diarrhea, blood in stools, or recent travel.    ALLERGIES:  Allergies[1]    CURRENT MEDICATIONS:  Current Outpatient Medications   Medication Sig Dispense Refill    ATORVASTATIN 20 MG Oral Tab Take 1 tablet (20 mg total) by mouth nightly. 90 tablet 0       MEDICAL HISTORY:  History reviewed. No pertinent past medical history.  Past Surgical History:   Procedure Laterality Date    Inguinal hernia repair      Late teens when repaired     Knee surgery Left     Shoulder arthroscopy Right      Family History   Problem Relation Age of Onset    Other (BRAIN CANCER) Maternal Grandmother 70    Other (Brain cancer) Maternal Grandfather 70     Family Status   Relation Status    Mo Alive    Fa Alive    Sis Alive    MGMA     MGFA     PGMA     PGFA      Social History     Socioeconomic History    Marital status:    Occupational History    Occupation: Maintainence      Employer: [employer] [address] [place] [address]   Tobacco Use    Smoking status: Never    Smokeless tobacco: Never   Vaping Use    Vaping status: Never Used   Substance and Sexual Activity    Alcohol use: Never    Drug use: Never    Sexual activity: Yes     Partners:  Female     Comment: No kids      Social Drivers of Health      Received from [facility], [facility]    Social Connections    Received from [facility], [facility]    Housing Stability       ROS:  GENERAL:  Denies recorded temperatures greater than 100.5F  RESPIRATORY:  Denies difficulty breathing  CARDIAC:  Denies chest pain with exertion    VITALS:   /60   Pulse 98   Temp 98.1 °F (36.7 °C) (Temporal)   Resp 16   Ht 5' 7\" (1.702 m)   Wt 160 lb (72.6 kg)   SpO2 99%   BMI 25.06 kg/m²     Reviewed by Emely Kuhn MS, APRN, FNP-BC    PHYSICAL EXAM:    Constitutional:       Appears well.  Sitting upright on exam table.  Well developed, well nourished, and in no acute distress  HEENT:      Facial features symmetric. Normocephalic and atraumatic  Abdomen:       Nondistended.     Bowel sounds normoactive.     Abdomen soft, nontender without organomegaly.       No CVA tenderness.  Musculoskeletal:         Movements smooth and controlled with appropriate coordination.       Gait is steady, nonantalgic.  Neuro:       No focal deficits, cranial nerves grossly intact.       Movements smooth and controlled, appropriate coordination without ataxia or tremors.  Skin:     Warm and dry without jaundice or rashes.  Psychiatric:         Alert and oriented.  Calm and cooperative.  Speech is clear.     ASSESSMENT & PLAN:    1. Abdominal bloating  - CT ABDOMEN(CONTRAST ONLY) (CPT=74160); Future  - Gastro Referral - In Network    2. Periumbilical pain  - CT ABDOMEN(CONTRAST ONLY) (CPT=74160); Future  - Gastro Referral - In Network    We discussed various differential diagnoses including h.pylori, diverticulitis/diverticulosis, biliary colic, gallstones, and/or polyp.  We will begin with CT with contrast  Considering testing for h.pylori and/or colonoscopy  Follow-up to be guided by results         [1] No Known Allergies

## 2024-11-04 ENCOUNTER — TELEPHONE (OUTPATIENT)
Dept: FAMILY MEDICINE CLINIC | Facility: CLINIC | Age: 49
End: 2024-11-04

## 2024-11-04 NOTE — TELEPHONE ENCOUNTER
Pt in office. CT of abdomen was denied by [facility] PPO. Per notes, pt is to go to [facility]. Per patient- can order be ran through [facility] first?

## 2024-11-04 NOTE — TELEPHONE ENCOUNTER
#[phone]  PATIENT CAN NOT COME TO [facility] OR [facility] WE ARE OUT OF NETWORK PATIENT WOULD NEED TO GET A REFERRAL IN ORDER TO COME HERE BUT HE CAN GO TO AN [facility] LOCATION.

## 2024-12-16 DIAGNOSIS — Z00.00 WELL ADULT EXAM: Primary | ICD-10-CM

## 2024-12-16 NOTE — TELEPHONE ENCOUNTER
atorvastatin 20 MG Oral Tab   Pt failed refill protocol for the following reasons:    Cholesterol Medication Protocol [id number]/16/2024 09:08 AM   Protocol Details ALT < 80    ALT resulted within past year    Lipid panel within past 12 months    In person appointment or virtual visit in the past 12 mos or appointment in next 3 mos      Last refill: 6/21/2024, for #90, 0 refill  Last Px:3/1/2023  Last appt: 10/21/2024  Next appt: N/A    Forward to Dr. Zoë Wills, please advise on refills. Thank you.

## 2024-12-17 RX ORDER — ATORVASTATIN CALCIUM 20 MG/1
20 TABLET, FILM COATED ORAL NIGHTLY
Qty: 30 TABLET | Refills: 0 | Status: SHIPPED | OUTPATIENT
Start: 2024-12-17

## 2024-12-17 NOTE — TELEPHONE ENCOUNTER
Called Pt and informed them of Dr. Middleton's note and offered to assist PT to schedule a physical to which Pt stated through their insurance they get it done through the school. Pt was informed that OV is required for medication refills, and that they are due for wellness to which Pt verbalized their acknowledgment.

## 2025-01-13 ENCOUNTER — LABORATORY ENCOUNTER (OUTPATIENT)
Dept: LAB | Age: 50
End: 2025-01-13
Attending: FAMILY MEDICINE
Payer: COMMERCIAL

## 2025-01-13 DIAGNOSIS — Z00.00 WELL ADULT EXAM: ICD-10-CM

## 2025-01-13 LAB
ALBUMIN SERPL-MCNC: 4.5 G/DL (ref 3.2–4.8)
ALBUMIN/GLOB SERPL: 1.9 {RATIO} (ref 1–2)
ALP LIVER SERPL-CCNC: 89 U/L
ALT SERPL-CCNC: 23 U/L
ANION GAP SERPL CALC-SCNC: 5 MMOL/L (ref 0–18)
AST SERPL-CCNC: 24 U/L (ref ?–34)
BASOPHILS # BLD AUTO: 0.04 X10(3) UL (ref 0–0.2)
BASOPHILS NFR BLD AUTO: 0.7 %
BILIRUB SERPL-MCNC: 0.8 MG/DL (ref 0.3–1.2)
BUN BLD-MCNC: 19 MG/DL (ref 9–23)
CALCIUM BLD-MCNC: 9.7 MG/DL (ref 8.7–10.4)
CHLORIDE SERPL-SCNC: 108 MMOL/L (ref 98–112)
CHOLEST SERPL-MCNC: 171 MG/DL (ref ?–200)
CO2 SERPL-SCNC: 27 MMOL/L (ref 21–32)
CREAT BLD-MCNC: 0.93 MG/DL
EGFRCR SERPLBLD CKD-EPI 2021: 101 ML/MIN/1.73M2 (ref 60–?)
EOSINOPHIL # BLD AUTO: 0.09 X10(3) UL (ref 0–0.7)
EOSINOPHIL NFR BLD AUTO: 1.5 %
ERYTHROCYTE [DISTWIDTH] IN BLOOD BY AUTOMATED COUNT: 12 %
FASTING PATIENT LIPID ANSWER: YES
FASTING STATUS PATIENT QL REPORTED: YES
GLOBULIN PLAS-MCNC: 2.4 G/DL (ref 2–3.5)
GLUCOSE BLD-MCNC: 98 MG/DL (ref 70–99)
HCT VFR BLD AUTO: 44.3 %
HDLC SERPL-MCNC: 47 MG/DL (ref 40–59)
HGB BLD-MCNC: 14.8 G/DL
IMM GRANULOCYTES # BLD AUTO: 0.02 X10(3) UL (ref 0–1)
IMM GRANULOCYTES NFR BLD: 0.3 %
LDLC SERPL CALC-MCNC: 106 MG/DL (ref ?–100)
LYMPHOCYTES # BLD AUTO: 1.47 X10(3) UL (ref 1–4)
LYMPHOCYTES NFR BLD AUTO: 24.4 %
MCH RBC QN AUTO: 30.3 PG (ref 26–34)
MCHC RBC AUTO-ENTMCNC: 33.4 G/DL (ref 31–37)
MCV RBC AUTO: 90.8 FL
MONOCYTES # BLD AUTO: 0.59 X10(3) UL (ref 0.1–1)
MONOCYTES NFR BLD AUTO: 9.8 %
NEUTROPHILS # BLD AUTO: 3.81 X10 (3) UL (ref 1.5–7.7)
NEUTROPHILS # BLD AUTO: 3.81 X10(3) UL (ref 1.5–7.7)
NEUTROPHILS NFR BLD AUTO: 63.3 %
NONHDLC SERPL-MCNC: 124 MG/DL (ref ?–130)
OSMOLALITY SERPL CALC.SUM OF ELEC: 292 MOSM/KG (ref 275–295)
PLATELET # BLD AUTO: 217 10(3)UL (ref 150–450)
POTASSIUM SERPL-SCNC: 4.1 MMOL/L (ref 3.5–5.1)
PROT SERPL-MCNC: 6.9 G/DL (ref 5.7–8.2)
RBC # BLD AUTO: 4.88 X10(6)UL
SODIUM SERPL-SCNC: 140 MMOL/L (ref 136–145)
T4 FREE SERPL-MCNC: 1.3 NG/DL (ref 0.8–1.7)
TRIGL SERPL-MCNC: 98 MG/DL (ref 30–149)
TSI SER-ACNC: 1.27 UIU/ML (ref 0.55–4.78)
VLDLC SERPL CALC-MCNC: 17 MG/DL (ref 0–30)
WBC # BLD AUTO: 6 X10(3) UL (ref 4–11)

## 2025-01-13 PROCEDURE — 80050 GENERAL HEALTH PANEL: CPT | Performed by: FAMILY MEDICINE

## 2025-01-13 PROCEDURE — 84439 ASSAY OF FREE THYROXINE: CPT | Performed by: FAMILY MEDICINE

## 2025-01-13 PROCEDURE — 80061 LIPID PANEL: CPT | Performed by: FAMILY MEDICINE

## 2025-01-14 RX ORDER — ATORVASTATIN CALCIUM 20 MG/1
20 TABLET, FILM COATED ORAL NIGHTLY
Qty: 30 TABLET | Refills: 0 | Status: SHIPPED | OUTPATIENT
Start: 2025-01-14

## 2025-01-14 NOTE — TELEPHONE ENCOUNTER
Cholesterol Medication Protocol [id number]/14/2025 09:36 AM   Protocol Details ALT < 80    ALT resulted within past year    Lipid panel within past 12 months    In person appointment or virtual visit in the past 12 mos or appointment in next 3 mos    Medication is active on med list

## 2025-02-10 RX ORDER — ATORVASTATIN CALCIUM 20 MG/1
20 TABLET, FILM COATED ORAL NIGHTLY
Qty: 90 TABLET | Refills: 1 | Status: SHIPPED | OUTPATIENT
Start: 2025-02-10

## 2025-02-10 NOTE — TELEPHONE ENCOUNTER
Sent per passed protocol:    Cholesterol Medication Protocol Evyfjy40/10/2025 09:29 AM   Protocol Details ALT < 80    ALT resulted within past year    Lipid panel within past 12 months    In person appointment or virtual visit in the past 12 mos or appointment in next 3 mos    Medication is active on med list

## 2025-03-10 ENCOUNTER — OFFICE VISIT (OUTPATIENT)
Dept: FAMILY MEDICINE CLINIC | Facility: CLINIC | Age: 50
End: 2025-03-10
Payer: COMMERCIAL

## 2025-03-10 VITALS
TEMPERATURE: 99 F | HEIGHT: 67 IN | OXYGEN SATURATION: 100 % | SYSTOLIC BLOOD PRESSURE: 124 MMHG | HEART RATE: 97 BPM | WEIGHT: 157 LBS | BODY MASS INDEX: 24.64 KG/M2 | DIASTOLIC BLOOD PRESSURE: 82 MMHG | RESPIRATION RATE: 12 BRPM

## 2025-03-10 DIAGNOSIS — H93.8X1 EAR MASS, RIGHT: Primary | ICD-10-CM

## 2025-03-10 PROCEDURE — 99203 OFFICE O/P NEW LOW 30 MIN: CPT | Performed by: FAMILY MEDICINE

## 2025-03-10 PROCEDURE — 3008F BODY MASS INDEX DOCD: CPT | Performed by: FAMILY MEDICINE

## 2025-03-10 PROCEDURE — 3074F SYST BP LT 130 MM HG: CPT | Performed by: FAMILY MEDICINE

## 2025-03-10 PROCEDURE — 3079F DIAST BP 80-89 MM HG: CPT | Performed by: FAMILY MEDICINE

## 2025-03-10 NOTE — PROGRESS NOTES
Chief Complaint   Patient presents with    Derm Problem     Right earlobe- dry skin      HPI  Patient has a nonhealing lesion on the top of his right ear which has failed to heal for the last few months and is now somewhat uncomfortable and tender. He has had a lot of sun exposure throughout his life and wishes to have this lesion removed and evaluated.     ROS  As per HPI and all other systems reviewed and are negative      No past medical history on file.    Past Surgical History:   Procedure Laterality Date    Inguinal hernia repair      Late teens when repaired     Knee surgery Left     Shoulder arthroscopy Right        Social History     Socioeconomic History    Marital status:    Occupational History    Occupation: Maintainence      Employer: [employer] [address] [place] [address]   Tobacco Use    Smoking status: Never    Smokeless tobacco: Never   Vaping Use    Vaping status: Never Used   Substance and Sexual Activity    Alcohol use: Never    Drug use: Never    Sexual activity: Yes     Partners: Female     Comment: No kids        Family History   Problem Relation Age of Onset    Other (BRAIN CANCER) Maternal Grandmother 70    Other (Brain cancer) Maternal Grandfather 70        Medications Ordered Prior to Encounter[1]      Objective  Vitals:    03/10/25 1037   BP: 124/82   Pulse: 97   Resp: 12   Temp: 99 °F (37.2 °C)   TempSrc: Temporal   SpO2: 100%   Weight: 157 lb (71.2 kg)   Height: 5' 7\" (1.702 m)     Physical Exam  Constitutional:       Appearance: Normal appearance.   HEENT:      Head: Normocephalic and atraumatic.      Eyes: PERRLA no notable nystagmus     Ears: small 0.3cm palpable mass on the helix of right upper ear that is a little tender to touch without erythema     Nose: Nose normal.      Mouth: Mucous membranes are moist.      Neck: no masses   Musculoskeletal:         General: Normal range of motion.      Cervical back: Normal range of motion.   Skin:     General: Skin is warm and dry.    Neurological:      General: No focal deficit present.      Mental Status: She is alert and oriented to person, place, and time.   Psychiatric:         Mood and Affect: Mood normal.         Thought Content: Thought content normal.       Assessment and Plan  Kain was seen today for derm problem.    Diagnoses and all orders for this visit:    Ear mass, right  -     ENT Referral - In Network           Follow up  No follow-ups on file.      Patient Instructions  There are no Patient Instructions on file for this visit.       Frantz Wills MD       This note was created by Dragon voice recognition. Errors in content may be related to improper recognition by the system; efforts to review and correct have been done but errors may still exist. Please be advised the primary purpose of this note is for me to communicate medical care. Standard sentence structure is not always used. Medical terminology and medical abbreviations may be used. There may be grammatical, typographical, and automated fill ins that may have errors missed in proofreading.          [1]   Current Outpatient Medications on File Prior to Visit   Medication Sig Dispense Refill    atorvastatin 20 MG Oral Tab TAKE ONE TABLET BY MOUTH NIGHTLY 90 tablet 1     No current facility-administered medications on file prior to visit.

## 2025-03-12 ENCOUNTER — OFFICE VISIT (OUTPATIENT)
Facility: LOCATION | Age: 50
End: 2025-03-12
Payer: COMMERCIAL

## 2025-03-12 DIAGNOSIS — H61.91 EARLOBE LESION, RIGHT: Primary | ICD-10-CM

## 2025-03-12 PROCEDURE — 99203 OFFICE O/P NEW LOW 30 MIN: CPT | Performed by: OTOLARYNGOLOGY

## 2025-03-12 NOTE — PROGRESS NOTES
Otolaryngology Consultation Note     Reason for consultation: ear mass  Consulting physician and service: Frantz Wills MD     HPI: 50 y/o M presents with small lesion on right earlobe. Initially noted 3 months ago and has persisted since then. Positive pain, stable in size. Occasionally bleeds. Tried OTC vaseline without much improvement. No h/o skin cancer but notes exposure to the sun. No hearing loss, tinnitus, otalgia, otorrhea or vertigo. Left ear ok. No other complaints.      Past Medical History: No past medical history on file.     Past Surgical History:   Past Surgical History:   Procedure Laterality Date    Inguinal hernia repair      Late teens when repaired     Knee surgery Left     Shoulder arthroscopy Right         Medication: Scheduled Meds:  Continuous Infusions:  PRN Meds:.     Allergies:  Allergies[1]  Pertinent Family History:   Family History   Problem Relation Age of Onset    Other (BRAIN CANCER) Maternal Grandmother 70    Other (Brain cancer) Maternal Grandfather 70        Pertinent Social History:   Social History     Socioeconomic History    Marital status:      Spouse name: Not on file    Number of children: Not on file    Years of education: Not on file    Highest education level: Not on file   Occupational History    Occupation: Maintainence      Employer[employer] [address] [place] [address]   Tobacco Use    Smoking status: Never    Smokeless tobacco: Never   Vaping Use    Vaping status: Never Used   Substance and Sexual Activity    Alcohol use: Never    Drug use: Never    Sexual activity: Yes     Partners: Female     Comment: No kids    Other Topics Concern    Not on file   Social History Narrative    Not on file     Social Drivers of Health     Food Insecurity: Not on file   Transportation Needs: Not on file   Stress: Not on file   Housing Stability: Low Risk  (7/9/2021)    [facility], [facility]    Housing Stability      Mortgage Payment Concerns?: Not on file     Number of Places Lived in the Last Year: Not on file     Unstable Housing?: Not on file        Review of Systems:  Constitutional: Negative.  HENT: see above  Eyes: Negative.  Respiratory: Negative.  Cardiovascular: Negative.  Gastrointestinal: Negative.  Musculoskeletal: Negative.  Skin: Negative.  Renal: Negative  Endocrine: Negative  Psychiatric/Behavioral: Negative.     Physical Examination:  Vitals: There were no vitals taken for this visit.     General: Breathing comfortably on room air while sitting up. Able to communicate verbally. Voice normal. Normal appearing body habitus.     Musculoskeletal: Head: Atraumatic and normocephalic.     Neck: Full ROM and able to extend without issues     Ears: 2-3 mm lesion noted along right helix, non-ulcerative, no bleeding; External auditory canals clear with no evidence of significant cerumen or stenosis. Tympanic membranes visible with no evidence of retraction or perforation. No evidence of middle ear effusion bilaterally.     Nose: No sinus tenderness bilaterally upon palpation. No obvious nasal deformity. No masses, rhinorrhea, epistaxis. Nasal septum, mucosa, and turbinates appear normal.     Mouth/Throat: Salivary glands appear normal with no evidence of pain or mass. No masses or lesions noted within the oral mucosa, hard and soft palates, tongue, tonsils and posterior pharynx. Able to tolerate secretions. Oral cavity and oropharynx widely patent. Tonsils 1 plus and symmetric. Posterior pharyngeal walls appear normal. Thyroid non tender to palpation without evidence of mass or nodules.     Eyes: Extraocular movements intact and pupils equally reactive to light stimulus. No spontaneous or gaze-evoked nystagmus. No proptosis or ecchymosis. VFI.     Lymphatic: No significant cervical lymphadenopathy noted.     Neuro: CN 7 intact with symmetric mobility and strength. No loss of facial sensation.     Skin: Dry, normal turgor,  normal color.     Psych: Alert and oriented to person/place/time. Normal affect, amiable     Significant laboratory values: n/a     Imaging: n/a     Procedures: n/a     Assessment/Plan:     Right external ear lesion: recommend bacitracin ointment BID x 7 days then vaseline BID x 7 days. Will monitor. Recommend biopsy if persists/worsens.      Dr. Frantz Wills MD, thank you for involving me in this patient's care. Please contact me with further questions or concerns.    Shayne Jain MD         [1] No Known Allergies

## 2025-04-07 ENCOUNTER — OFFICE VISIT (OUTPATIENT)
Facility: LOCATION | Age: 50
End: 2025-04-07
Payer: COMMERCIAL

## 2025-04-07 DIAGNOSIS — H61.91 SKIN LESION OF RIGHT EXTERNAL EAR: Primary | ICD-10-CM

## 2025-04-07 PROCEDURE — 99213 OFFICE O/P EST LOW 20 MIN: CPT | Performed by: OTOLARYNGOLOGY

## 2025-04-07 NOTE — PROGRESS NOTES
Otolaryngology Consultation Note     HPI: 50 y/o M with right external ear lesion. Here for follow up. Completed bacitracin ointment. Notes ear lesion resolved. No other new complaints since last visit.      Past Medical History: No past medical history on file.     Past Surgical History:   Past Surgical History:   Procedure Laterality Date    Inguinal hernia repair      Late teens when repaired     Knee surgery Left     Shoulder arthroscopy Right         Medication: Scheduled Meds:  Continuous Infusions:  PRN Meds:.     Allergies:  Allergies[1]  Pertinent Family History:   Family History   Problem Relation Age of Onset    Other (BRAIN CANCER) Maternal Grandmother 70    Other (Brain cancer) Maternal Grandfather 70        Pertinent Social History:   Social History     Socioeconomic History    Marital status:      Spouse name: Not on file    Number of children: Not on file    Years of education: Not on file    Highest education level: Not on file   Occupational History    Occupation: Maintainence      Employer: [employer] [address] [place] [address]   Tobacco Use    Smoking status: Never    Smokeless tobacco: Never   Vaping Use    Vaping status: Never Used   Substance and Sexual Activity    Alcohol use: Never    Drug use: Never    Sexual activity: Yes     Partners: Female     Comment: No kids    Other Topics Concern    Not on file   Social History Narrative    Not on file     Social Drivers of Health     Food Insecurity: Not on file   Transportation Needs: Not on file   Stress: Not on file   Housing Stability: Low Risk  (7/9/2021)    Received from [facility], [facility]    Housing Stability     Mortgage Payment Concerns?: Not on file     Number of Places Lived in the Last Year: Not on file     Unstable Housing?: Not on file        Review of Systems:  Constitutional: Negative.  HENT: see above  Eyes: Negative.  Respiratory: Negative.  Cardiovascular: Negative.  Gastrointestinal:  Negative.  Musculoskeletal: Negative.  Skin: Negative.  Renal: Negative  Endocrine: Negative  Psychiatric/Behavioral: Negative.     Physical Examination:  Vitals: There were no vitals taken for this visit.     General: Breathing comfortably on room air while sitting up. Able to communicate verbally. Voice normal. Normal appearing body habitus.     Musculoskeletal: Head: Atraumatic and normocephalic.     Neck: Full ROM and able to extend without issues     Ears: Previously noted lesion along right helix resolved      Eyes: Extraocular movements intact and pupils equally reactive to light stimulus. No spontaneous or gaze-evoked nystagmus. No proptosis or ecchymosis. VFI.      Neuro: CN 7 intact with symmetric mobility and strength. No loss of facial sensation.     Skin: Dry, normal turgor, normal color.     Psych: Alert and oriented to person/place/time. Normal affect, amiable       Procedures: n/a     Assessment/Plan:      Right external ear lesion: resolved. No indication for biopsy at this time.     Shayne Jain MD         [1] No Known Allergies

## (undated) NOTE — LETTER
Date: 1/29/2024    Patient Name: Kain Pryor          To Whom it may concern:    This letter has been written at the patient's request. The above patient was seen at the [facility] for treatment of a medical condition.    This patient should be excused from attending work/school from 01/29/2024 through 01/30/2024.    The patient may return to work/school on 01/31/2024 as long as symptoms are improved and is is fever free 24 hours prior to the start of his shift.        Sincerely,        Emely SANTIAGO, APRN, FNP-[facility]  [phone]

## (undated) NOTE — LETTER
02/12/21          [address] [place], [place] [address]ley [facility] [id number]           Dear Maryann Syed     Our records indicate that you have outstanding lab work and or testing that was ordered for you and has not yet been completed:   Labs to be comp